# Patient Record
Sex: FEMALE | Race: WHITE | NOT HISPANIC OR LATINO | RURAL
[De-identification: names, ages, dates, MRNs, and addresses within clinical notes are randomized per-mention and may not be internally consistent; named-entity substitution may affect disease eponyms.]

---

## 2024-05-01 ENCOUNTER — OFFICE VISIT (OUTPATIENT)
Dept: FAMILY MEDICINE | Facility: CLINIC | Age: 54
End: 2024-05-01
Payer: COMMERCIAL

## 2024-05-01 VITALS
HEART RATE: 73 BPM | HEIGHT: 69 IN | TEMPERATURE: 98 F | OXYGEN SATURATION: 98 % | BODY MASS INDEX: 15.85 KG/M2 | SYSTOLIC BLOOD PRESSURE: 95 MMHG | WEIGHT: 107 LBS | DIASTOLIC BLOOD PRESSURE: 68 MMHG

## 2024-05-01 DIAGNOSIS — R05.9 COUGH, UNSPECIFIED TYPE: ICD-10-CM

## 2024-05-01 DIAGNOSIS — R52 GENERALIZED BODY ACHES: ICD-10-CM

## 2024-05-01 DIAGNOSIS — R19.7 DIARRHEA, UNSPECIFIED TYPE: ICD-10-CM

## 2024-05-01 DIAGNOSIS — J02.9 SORE THROAT: ICD-10-CM

## 2024-05-01 DIAGNOSIS — J32.9 SINUSITIS, UNSPECIFIED CHRONICITY, UNSPECIFIED LOCATION: Primary | ICD-10-CM

## 2024-05-01 LAB
CTP QC/QA: YES
MOLECULAR STREP A: NEGATIVE
POC MOLECULAR INFLUENZA A AGN: NEGATIVE
POC MOLECULAR INFLUENZA B AGN: NEGATIVE
SARS-COV-2 RDRP RESP QL NAA+PROBE: NEGATIVE

## 2024-05-01 PROCEDURE — 87635 SARS-COV-2 COVID-19 AMP PRB: CPT | Mod: QW,,, | Performed by: NURSE PRACTITIONER

## 2024-05-01 PROCEDURE — 87502 INFLUENZA DNA AMP PROBE: CPT | Mod: QW,,, | Performed by: NURSE PRACTITIONER

## 2024-05-01 PROCEDURE — 3078F DIAST BP <80 MM HG: CPT | Mod: ,,, | Performed by: NURSE PRACTITIONER

## 2024-05-01 PROCEDURE — 1160F RVW MEDS BY RX/DR IN RCRD: CPT | Mod: ,,, | Performed by: NURSE PRACTITIONER

## 2024-05-01 PROCEDURE — 3074F SYST BP LT 130 MM HG: CPT | Mod: ,,, | Performed by: NURSE PRACTITIONER

## 2024-05-01 PROCEDURE — 87651 STREP A DNA AMP PROBE: CPT | Mod: QW,,, | Performed by: NURSE PRACTITIONER

## 2024-05-01 PROCEDURE — 99213 OFFICE O/P EST LOW 20 MIN: CPT | Mod: ,,, | Performed by: NURSE PRACTITIONER

## 2024-05-01 PROCEDURE — 1159F MED LIST DOCD IN RCRD: CPT | Mod: ,,, | Performed by: NURSE PRACTITIONER

## 2024-05-01 PROCEDURE — 3008F BODY MASS INDEX DOCD: CPT | Mod: ,,, | Performed by: NURSE PRACTITIONER

## 2024-05-01 RX ORDER — AMOXICILLIN 875 MG/1
875 TABLET, FILM COATED ORAL EVERY 12 HOURS
Qty: 20 TABLET | Refills: 0 | Status: SHIPPED | OUTPATIENT
Start: 2024-05-01

## 2024-05-01 NOTE — PROGRESS NOTES
Clinic Note    Maribell Mariscal is a 53 y.o. female     Chief Complaint:   Chief Complaint   Patient presents with    Sore Throat    Sinus Problem     Patient states her grandchild had a cold this weekend. She states she assumed she picked up the cold from her grandchild. She states she has cough, diarrhea, sore throat, and hoarse.     Hoarse    Diarrhea     She noticed her stools begin to get loose 2 days ago.     Generalized Body Aches    Cough        Subjective:    Patient complains of sore throat, cough, and congestion. Admits to loose bm past 2 days. Denies fever. Patient states symptoms X 3 days.     Sore Throat   Associated symptoms include congestion, coughing and diarrhea. Pertinent negatives include no abdominal pain, headaches, shortness of breath or vomiting.   Sinus Problem  Associated symptoms include congestion, coughing, sinus pressure and a sore throat. Pertinent negatives include no headaches or shortness of breath.   Diarrhea   Associated symptoms include coughing. Pertinent negatives include no abdominal pain, fever, headaches or vomiting.   Cough  Associated symptoms include postnasal drip and a sore throat. Pertinent negatives include no fever, headaches or shortness of breath.        Allergies:   Review of patient's allergies indicates:  No Known Allergies     Past Medical History:  No past medical history on file.     Current Medications:    Current Outpatient Medications:     amoxicillin (AMOXIL) 875 MG tablet, Take 1 tablet (875 mg total) by mouth every 12 (twelve) hours., Disp: 20 tablet, Rfl: 0       Review of Systems   Constitutional:  Negative for fever.   HENT:  Positive for nasal congestion, postnasal drip, sinus pressure/congestion and sore throat.    Respiratory:  Positive for cough. Negative for shortness of breath.    Gastrointestinal:  Positive for diarrhea and nausea. Negative for abdominal pain and vomiting.   Neurological:  Negative for headaches.          Objective:    BP 95/68  "(BP Location: Left arm, Patient Position: Sitting, BP Method: Medium (Automatic))   Pulse 73   Temp 98.3 °F (36.8 °C) (Oral)   Ht 5' 9" (1.753 m)   Wt 48.5 kg (107 lb)   SpO2 98%   BMI 15.80 kg/m²      Physical Exam  Constitutional:       Appearance: Normal appearance.   HENT:      Nose: Congestion present. No rhinorrhea.      Right Sinus: Maxillary sinus tenderness present. No frontal sinus tenderness.      Left Sinus: Maxillary sinus tenderness present. No frontal sinus tenderness.      Comments: Tenderness right maxillary more so     Mouth/Throat:      Pharynx: Posterior oropharyngeal erythema present. No oropharyngeal exudate.   Eyes:      Extraocular Movements: Extraocular movements intact.   Cardiovascular:      Rate and Rhythm: Normal rate and regular rhythm.      Pulses: Normal pulses.      Heart sounds: Normal heart sounds.   Pulmonary:      Effort: Pulmonary effort is normal.      Breath sounds: Normal breath sounds.   Abdominal:      Palpations: Abdomen is soft.      Tenderness: There is no abdominal tenderness. There is no guarding or rebound.   Musculoskeletal:      Cervical back: Neck supple.   Lymphadenopathy:      Cervical: No cervical adenopathy.   Neurological:      Mental Status: She is alert and oriented to person, place, and time.          Assessment and Plan:    1. Sinusitis, unspecified chronicity, unspecified location    2. Generalized body aches    3. Sore throat    4. Diarrhea, unspecified type    5. Cough, unspecified type         Sinusitis, unspecified chronicity, unspecified location  -     amoxicillin (AMOXIL) 875 MG tablet; Take 1 tablet (875 mg total) by mouth every 12 (twelve) hours.  Dispense: 20 tablet; Refill: 0  -continue dayquil/nightquil prn    Generalized body aches  -     POCT Influenza A/B Molecular  -     POCT COVID-19 Rapid Screening  -alternate tylenol and ibuprofen prn fever/aches    Sore throat  -     POCT Strep A, Molecular    Diarrhea, unspecified type  -     " POCT Strep A, Molecular  -bland diet and advance as tolerated    Cough, unspecified type  -     POCT Influenza A/B Molecular  -     POCT COVID-19 Rapid Screening      Results for orders placed or performed in visit on 05/01/24   POCT Influenza A/B Molecular   Result Value Ref Range    POC Molecular Influenza A Ag Negative Negative    POC Molecular Influenza B Ag Negative Negative     Acceptable Yes      Results for orders placed or performed in visit on 05/01/24   POCT COVID-19 Rapid Screening   Result Value Ref Range    POC Rapid COVID Negative Negative     Acceptable Yes      Results for orders placed or performed in visit on 05/01/24   POCT Strep A, Molecular   Result Value Ref Range    Molecular Strep A, POC Negative Negative     Acceptable Yes          There are no Patient Instructions on file for this visit.   Follow up if symptoms worsen or fail to improve.

## 2025-05-14 ENCOUNTER — OFFICE VISIT (OUTPATIENT)
Dept: FAMILY MEDICINE | Facility: CLINIC | Age: 55
End: 2025-05-14
Payer: COMMERCIAL

## 2025-05-14 VITALS
SYSTOLIC BLOOD PRESSURE: 106 MMHG | BODY MASS INDEX: 17.51 KG/M2 | HEART RATE: 70 BPM | HEIGHT: 69 IN | RESPIRATION RATE: 20 BRPM | WEIGHT: 118.19 LBS | TEMPERATURE: 98 F | DIASTOLIC BLOOD PRESSURE: 70 MMHG | OXYGEN SATURATION: 97 %

## 2025-05-14 DIAGNOSIS — R21 RASH: ICD-10-CM
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
## 2025-05-14 DIAGNOSIS — S10.96XA TICK BITE OF NECK, INITIAL ENCOUNTER: Primary | ICD-10-CM

## 2025-05-14 PROCEDURE — 3008F BODY MASS INDEX DOCD: CPT | Mod: ,,, | Performed by: NURSE PRACTITIONER

## 2025-05-14 PROCEDURE — 1160F RVW MEDS BY RX/DR IN RCRD: CPT | Mod: ,,, | Performed by: NURSE PRACTITIONER

## 2025-05-14 PROCEDURE — 3078F DIAST BP <80 MM HG: CPT | Mod: ,,, | Performed by: NURSE PRACTITIONER

## 2025-05-14 PROCEDURE — 3074F SYST BP LT 130 MM HG: CPT | Mod: ,,, | Performed by: NURSE PRACTITIONER

## 2025-05-14 PROCEDURE — 1159F MED LIST DOCD IN RCRD: CPT | Mod: ,,, | Performed by: NURSE PRACTITIONER

## 2025-05-14 PROCEDURE — 99213 OFFICE O/P EST LOW 20 MIN: CPT | Mod: ,,, | Performed by: NURSE PRACTITIONER

## 2025-05-14 RX ORDER — DOXYCYCLINE 100 MG/1
100 CAPSULE ORAL 2 TIMES DAILY
Qty: 14 CAPSULE | Refills: 0 | Status: SHIPPED | OUTPATIENT
Start: 2025-05-14

## 2025-05-14 RX ORDER — METHYLPREDNISOLONE 4 MG/1
TABLET ORAL
Qty: 21 EACH | Refills: 0 | Status: SHIPPED | OUTPATIENT
Start: 2025-05-14 | End: 2025-06-04

## 2025-05-14 NOTE — PROGRESS NOTES
"Clinic Note    Maribell Mariscal is a 54 y.o. female     Chief Complaint:   Chief Complaint   Patient presents with    Rash        Subjective:    Patient complains of rash to neck and face. Patient reports itching. Has used hydrocortisone cream and coconut oil. Rash seems to be spreading. Denies drainage. Denies fever.  Patient states she was hiking about 3 days ago. States found a tick crawling on face. States when she looked back at photos tick was also on neck where rash started. Patient states it was a deer tic. Would like tick labs.     Rash  Pertinent negatives include no cough, diarrhea, fever, shortness of breath or vomiting.        Allergies:   Review of patient's allergies indicates:  No Known Allergies     Past Medical History:  History reviewed. No pertinent past medical history.     Current Medications:  Current Medications[1]       Review of Systems   Constitutional:  Negative for fever.   Respiratory:  Negative for cough and shortness of breath.    Cardiovascular:  Negative for chest pain, palpitations and leg swelling.   Gastrointestinal:  Negative for abdominal pain, constipation, diarrhea, nausea and vomiting.   Genitourinary:  Negative for dysuria.   Integumentary:  Positive for rash.   Neurological:  Negative for headaches.          Objective:    /70 (BP Location: Left arm, Patient Position: Sitting)   Pulse 70   Temp 98.2 °F (36.8 °C) (Oral)   Resp 20   Ht 5' 9" (1.753 m)   Wt 53.6 kg (118 lb 3.2 oz)   SpO2 97%   BMI 17.46 kg/m²      Physical Exam  Constitutional:       Appearance: Normal appearance.   Eyes:      Extraocular Movements: Extraocular movements intact.   Cardiovascular:      Rate and Rhythm: Normal rate and regular rhythm.      Pulses: Normal pulses.      Heart sounds: Normal heart sounds.   Pulmonary:      Effort: Pulmonary effort is normal.      Breath sounds: Normal breath sounds.   Skin:     Findings: Erythema and rash present. Rash is macular and papular. Rash is not " pustular.      Comments: Erythematous rash to face and neck. No bullseye rash. No pustules or blister   Neurological:      Mental Status: She is alert and oriented to person, place, and time.          Assessment and Plan:    1. Tick bite of neck, initial encounter    2. Rash         Tick bite of neck, initial encounter  -     Ehrlichia Antibody Panel; Future; Expected date: 05/14/2025  -     Spotted Fever Group Antibodies; Future; Expected date: 05/14/2025  -     Lyme Antibody w/ Immunoblot Reflex; Future; Expected date: 05/14/2025  -     doxycycline (MONODOX) 100 MG capsule; Take 1 capsule (100 mg total) by mouth 2 (two) times daily.  Dispense: 14 capsule; Refill: 0    Rash  -     methylPREDNISolone (MEDROL DOSEPACK) 4 mg tablet; use as directed  Dispense: 21 each; Refill: 0    -discussed avoiding fragrance products      There are no Patient Instructions on file for this visit.   Follow up if symptoms worsen or fail to improve.          [1]   Current Outpatient Medications:     doxycycline (MONODOX) 100 MG capsule, Take 1 capsule (100 mg total) by mouth 2 (two) times daily., Disp: 14 capsule, Rfl: 0    methylPREDNISolone (MEDROL DOSEPACK) 4 mg tablet, use as directed, Disp: 21 each, Rfl: 0

## 2025-05-16 LAB
A PHAGOCYTOPH IGG TITR SER IF: NORMAL TITER
E CHAFFEENSIS IGG TITR SER IF: NORMAL TITER

## 2025-05-17 LAB
RICK SF IGG TITR SER IF: NORMAL {TITER}
RICK SF IGM TITR SER IF: NORMAL {TITER}

## 2025-05-21 ENCOUNTER — RESULTS FOLLOW-UP (OUTPATIENT)
Dept: FAMILY MEDICINE | Facility: CLINIC | Age: 55
End: 2025-05-21

## 2025-05-23 ENCOUNTER — OFFICE VISIT (OUTPATIENT)
Dept: FAMILY MEDICINE | Facility: CLINIC | Age: 55
End: 2025-05-23
Payer: COMMERCIAL

## 2025-05-23 VITALS
SYSTOLIC BLOOD PRESSURE: 120 MMHG | OXYGEN SATURATION: 98 % | TEMPERATURE: 99 F | BODY MASS INDEX: 17.53 KG/M2 | RESPIRATION RATE: 18 BRPM | DIASTOLIC BLOOD PRESSURE: 72 MMHG | HEART RATE: 85 BPM | HEIGHT: 69 IN | WEIGHT: 118.38 LBS
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
## 2025-05-23 DIAGNOSIS — R09.81 NASAL CONGESTION: ICD-10-CM

## 2025-05-23 DIAGNOSIS — M25.50 ARTHRALGIA, UNSPECIFIED JOINT: ICD-10-CM

## 2025-05-23 DIAGNOSIS — R21 RASH: ICD-10-CM

## 2025-05-23 DIAGNOSIS — S80.861A TICK BITE OF RIGHT LOWER LEG, INITIAL ENCOUNTER: ICD-10-CM

## 2025-05-23 DIAGNOSIS — U07.1 COVID: Primary | ICD-10-CM

## 2025-05-23 LAB
ALBUMIN SERPL BCP-MCNC: 4 G/DL (ref 3.5–5)
ALBUMIN/GLOB SERPL: 1.2 {RATIO}
ALP SERPL-CCNC: 60 U/L (ref 40–150)
ALT SERPL W P-5'-P-CCNC: 14 U/L
ANION GAP SERPL CALCULATED.3IONS-SCNC: 14 MMOL/L (ref 7–16)
AST SERPL W P-5'-P-CCNC: 26 U/L (ref 11–45)
BASOPHILS # BLD AUTO: 0.03 K/UL (ref 0–0.2)
BASOPHILS NFR BLD AUTO: 0.5 % (ref 0–1)
BILIRUB SERPL-MCNC: 0.3 MG/DL
BUN SERPL-MCNC: 15 MG/DL (ref 10–20)
BUN/CREAT SERPL: 18 (ref 6–20)
CALCIUM SERPL-MCNC: 8.6 MG/DL (ref 8.4–10.2)
CHLORIDE SERPL-SCNC: 104 MMOL/L (ref 98–107)
CO2 SERPL-SCNC: 26 MMOL/L (ref 22–29)
CREAT SERPL-MCNC: 0.82 MG/DL (ref 0.55–1.02)
CRP SERPL HS-MCNC: 4.79 MG/L
CTP QC/QA: YES
CTP QC/QA: YES
DIFFERENTIAL METHOD BLD: ABNORMAL
EGFR (NO RACE VARIABLE) (RUSH/TITUS): 86 ML/MIN/1.73M2
EOSINOPHIL # BLD AUTO: 0.24 K/UL (ref 0–0.5)
EOSINOPHIL NFR BLD AUTO: 3.8 % (ref 1–4)
ERYTHROCYTE [DISTWIDTH] IN BLOOD BY AUTOMATED COUNT: 13.1 % (ref 11.5–14.5)
ERYTHROCYTE [SEDIMENTATION RATE] IN BLOOD BY WESTERGREN METHOD: 2 MM/HR (ref 0–30)
GLOBULIN SER-MCNC: 3.4 G/DL (ref 2–4)
GLUCOSE SERPL-MCNC: 97 MG/DL (ref 74–100)
HCT VFR BLD AUTO: 42.9 % (ref 38–47)
HGB BLD-MCNC: 13.6 G/DL (ref 12–16)
IMM GRANULOCYTES # BLD AUTO: 0.01 K/UL (ref 0–0.04)
IMM GRANULOCYTES NFR BLD: 0.2 % (ref 0–0.4)
LYMPHOCYTES # BLD AUTO: 1.95 K/UL (ref 1–4.8)
LYMPHOCYTES NFR BLD AUTO: 30.7 % (ref 27–41)
MCH RBC QN AUTO: 31.2 PG (ref 27–31)
MCHC RBC AUTO-ENTMCNC: 31.7 G/DL (ref 32–36)
MCV RBC AUTO: 98.4 FL (ref 80–96)
MONOCYTES # BLD AUTO: 0.92 K/UL (ref 0–0.8)
MONOCYTES NFR BLD AUTO: 14.5 % (ref 2–6)
MPC BLD CALC-MCNC: 11 FL (ref 9.4–12.4)
NEUTROPHILS # BLD AUTO: 3.21 K/UL (ref 1.8–7.7)
NEUTROPHILS NFR BLD AUTO: 50.3 % (ref 53–65)
NRBC # BLD AUTO: 0 X10E3/UL
NRBC, AUTO (.00): 0 %
PLATELET # BLD AUTO: 324 K/UL (ref 150–400)
POC MOLECULAR INFLUENZA A AGN: NEGATIVE
POC MOLECULAR INFLUENZA B AGN: NEGATIVE
POTASSIUM SERPL-SCNC: 3.9 MMOL/L (ref 3.5–5.1)
PROT SERPL-MCNC: 7.4 G/DL (ref 6.4–8.3)
RBC # BLD AUTO: 4.36 M/UL (ref 4.2–5.4)
RHEUMATOID FACT SER NEPH-ACNC: NEGATIVE [IU]/ML
SARS-COV-2 RDRP RESP QL NAA+PROBE: POSITIVE
SODIUM SERPL-SCNC: 140 MMOL/L (ref 136–145)
WBC # BLD AUTO: 6.36 K/UL (ref 4.5–11)

## 2025-05-23 PROCEDURE — 80053 COMPREHEN METABOLIC PANEL: CPT | Mod: ,,, | Performed by: CLINICAL MEDICAL LABORATORY

## 2025-05-23 PROCEDURE — 85651 RBC SED RATE NONAUTOMATED: CPT | Mod: ,,, | Performed by: CLINICAL MEDICAL LABORATORY

## 2025-05-23 PROCEDURE — 86430 RHEUMATOID FACTOR TEST QUAL: CPT | Mod: ,,, | Performed by: CLINICAL MEDICAL LABORATORY

## 2025-05-23 PROCEDURE — 85025 COMPLETE CBC W/AUTO DIFF WBC: CPT | Mod: ,,, | Performed by: CLINICAL MEDICAL LABORATORY

## 2025-05-23 PROCEDURE — 86141 C-REACTIVE PROTEIN HS: CPT | Mod: ,,, | Performed by: CLINICAL MEDICAL LABORATORY

## 2025-05-23 RX ORDER — DOXYCYCLINE 100 MG/1
100 CAPSULE ORAL 2 TIMES DAILY
Qty: 14 CAPSULE | Refills: 0 | Status: SHIPPED | OUTPATIENT
Start: 2025-05-23 | End: 2025-05-30

## 2025-05-23 RX ORDER — METHYLPREDNISOLONE 4 MG/1
TABLET ORAL
Qty: 21 EACH | Refills: 0 | Status: SHIPPED | OUTPATIENT
Start: 2025-05-23 | End: 2025-06-13

## 2025-05-23 RX ORDER — DOXYCYCLINE 100 MG/1
100 CAPSULE ORAL 2 TIMES DAILY
Qty: 14 CAPSULE | Refills: 0 | Status: SHIPPED | OUTPATIENT
Start: 2025-05-23 | End: 2025-05-23 | Stop reason: SDUPTHER

## 2025-05-23 RX ORDER — METHYLPREDNISOLONE 4 MG/1
TABLET ORAL
Qty: 21 EACH | Refills: 0 | Status: SHIPPED | OUTPATIENT
Start: 2025-05-23 | End: 2025-05-23 | Stop reason: SDUPTHER

## 2025-05-23 NOTE — PROGRESS NOTES
Progress Note     JOAN OWEN MD   [address]  [place] [address]     PATIENT NAME: Maribell Mariscal  : 1970  DATE: 25  MRN: [id number]      Billing Provider: JOAN OWEN MD  Level of Service: IA OFFICE/OUTPT VISIT, EST, LEVL IV, 30-39 MIN  Patient PCP Information       Provider PCP Type    Primary Doctor No General                Rash (Found a tick on her R leg Tuesday./Now has another reddened rash to her neck around both eyes./States the rashed areas do itch./Seen [facility] [address] for a tick bite as well, had tick panels drawn./States her symptoms right now are similar to her previous symptoms at that visit.), Nasal Congestion (Started Tuesday.), Cough (Productive cough, clear and yellow/green phlegm./Symptoms started Tuesday.), Ear Fullness (R ear fullness w/ popping noises at times./States it does seem like its improving slightly.), and Edema (Edema/Redness noted around both eyes.)      SUBJECTIVE:     Maribell Mariscal is a 55 y.o.female who presents to clinic for Rash (Found a tick on her R leg Tuesday./Now has another reddened rash to her neck around both eyes./States the rashed areas do itch./Seen [facility] [address] for a tick bite as well, had tick panels drawn./States her symptoms right now are similar to her previous symptoms at that visit.), Nasal Congestion (Started Tuesday.), Cough (Productive cough, clear and yellow/green phlegm./Symptoms started Tuesday.), Ear Fullness (R ear fullness w/ popping noises at times./States it does seem like its improving slightly.), and Edema (Edema/Redness noted around both eyes.)    Patient presents to clinic today with a rash and URI symptoms.    Patient notes onset of URI symptoms Tuesday.  Patient reports nasal congestion, cough, ear fullness.  Patient believes she was febrile as well earlier in the week.  Patient works at a school.  Patient believes that her symptoms are improving.   "However, she woke this morning with neck and patient will rash as well as rash surrounding recent tick bite.  Patient was treated for a tick bite with similar rash on 05/14 with doxycycline and Medrol Dosepak with resolution of symptoms.  She notes that she found a tick on her leg on Tuesday.    Of note, patient does have a history of diffuse joint pain and arthritis.  She notes significant morning stiffness that improves throughout the day.  Her mom has Sjogren's and rheumatoid arthritis.  She has never personally been diagnosed with an autoimmune disease.    All other pertinent review of systems negative. Please see HPI for details.     Past Medical History:  has no past medical history on file.   Past Surgical History:  has no past surgical history on file.  Family History: family history is not on file.  Social History:  reports that she has quit smoking. Her smoking use included cigarettes and vaping with nicotine. She has never been exposed to tobacco smoke. She has never used smokeless tobacco. She reports that she does not drink alcohol and does not use drugs.  Allergies: Review of patient's allergies indicates:  No Known Allergies    Current Medications[1]   OBJECTIVE:     Vital Signs   /72   Pulse 85   Temp 98.8 °F (37.1 °C)   Resp 18   Ht 5' 9" (1.753 m)   Wt 53.7 kg (118 lb 6.4 oz)   LMP  (LMP Unknown)   SpO2 98%   BMI 17.48 kg/m²     Physical Exam  Constitutional:       General: She is not in acute distress.     Appearance: Normal appearance. She is not ill-appearing, toxic-appearing or diaphoretic.   HENT:      Head: Normocephalic and atraumatic.      Right Ear: Tympanic membrane normal.      Left Ear: Tympanic membrane normal.      Nose: Congestion present. No rhinorrhea.      Mouth/Throat:      Mouth: Mucous membranes are moist.      Pharynx: No oropharyngeal exudate or posterior oropharyngeal erythema.   Eyes:      Extraocular Movements: Extraocular movements intact.      Pupils: " Pupils are equal, round, and reactive to light.   Cardiovascular:      Rate and Rhythm: Normal rate and regular rhythm.      Pulses: Normal pulses.      Heart sounds: Normal heart sounds. No murmur heard.     No friction rub. No gallop.   Pulmonary:      Effort: Pulmonary effort is normal. No respiratory distress.      Breath sounds: No wheezing, rhonchi or rales.   Abdominal:      General: Abdomen is flat.      Palpations: Abdomen is soft.      Tenderness: There is no abdominal tenderness. There is no guarding or rebound.   Musculoskeletal:         General: Normal range of motion.      Cervical back: Normal range of motion.   Skin:     General: Skin is warm and dry.      Capillary Refill: Capillary refill takes less than 2 seconds.      Comments: Malar rash with associated periorbital component.  No tenderness to palpation.  Erythematous rash long neck.  Right leg with what appears to be tick bite with surrounding erythema.   Neurological:      General: No focal deficit present.      Mental Status: She is alert.   Psychiatric:         Mood and Affect: Mood normal.         Behavior: Behavior normal.         ASSESSMENT/PLAN:     1. COVID    2. Nasal congestion  -     POCT COVID-19 Rapid Screening  -     POCT Influenza A/B Molecular  Patient URI symptoms likely unrelated to rash in recent tick bite.  As a result flu and COVID testing were obtained and patient was found to be positive for COVID-19.  Counseled on supportive care with Zyrtec and Flonase nasal spray.  Discussed isolation precautions.  Work excuse provided.  Discussed emergency precautions.  Patient verbalized understanding.    3. Tick bite of right lower leg, initial encounter  -     Discontinue: doxycycline (VIBRAMYCIN) 100 MG Cap; Take 1 capsule (100 mg total) by mouth 2 (two) times daily. for 7 days  Dispense: 14 capsule; Refill: 0  -     doxycycline (VIBRAMYCIN) 100 MG Cap; Take 1 capsule (100 mg total) by mouth 2 (two) times daily. for 7 days   Dispense: 14 capsule; Refill: 0  Patient with recent tick bite on right lower extremity.  Patient with similar reaction with previous tick bite earlier in the month.  We will treat empirically with doxycycline.     4. Rash  -     Rheumatoid Factor Screen; Future; Expected date: 05/23/2025  -     NIKO EIA w/ Reflex to dsDNA/KIMBERLEY; Future; Expected date: 05/23/2025  -     Sedimentation Rate; Future; Expected date: 05/23/2025  -     CRP, High Sensitivity; Future; Expected date: 05/23/2025  -     CBC Auto Differential; Future; Expected date: 05/23/2025  -     Comprehensive Metabolic Panel; Future; Expected date: 05/23/2025  -     Discontinue: methylPREDNISolone (MEDROL DOSEPACK) 4 mg tablet; use as directed  Dispense: 21 each; Refill: 0  -     methylPREDNISolone (MEDROL DOSEPACK) 4 mg tablet; use as directed  Dispense: 21 each; Refill: 0    5. Arthralgia, unspecified joint  -     Rheumatoid Factor Screen; Future; Expected date: 05/23/2025  -     NIKO EIA w/ Reflex to dsDNA/KIMBERLEY; Future; Expected date: 05/23/2025  -     Sedimentation Rate; Future; Expected date: 05/23/2025  -     CRP, High Sensitivity; Future; Expected date: 05/23/2025  -     CBC Auto Differential; Future; Expected date: 05/23/2025  -     Comprehensive Metabolic Panel; Future; Expected date: 05/23/2025  -     Discontinue: methylPREDNISolone (MEDROL DOSEPACK) 4 mg tablet; use as directed  Dispense: 21 each; Refill: 0  -     methylPREDNISolone (MEDROL DOSEPACK) 4 mg tablet; use as directed  Dispense: 21 each; Refill: 0   Patient patient will rash is concerning for autoimmune disorder particularly given family history, persistent joint pain/stiffness, and prior improvement with steroid pack.  It is possible that reaction is secondary to recent tick bite given patient had similar symptoms earlier in the month after tick bite.  Again patient has been treated empirically with doxycycline.  Providing steroid pack and performing evaluation for autoimmune  arthritis.      Follow up in about 4 weeks (around 6/20/2025).      JOAN OWEN MD  05/23/2025    Due to voice recognition software, sound alike and misspelled words may be contained in the documentation.              [1]   Current Outpatient Medications:     doxycycline (VIBRAMYCIN) 100 MG Cap, Take 1 capsule (100 mg total) by mouth 2 (two) times daily. for 7 days, Disp: 14 capsule, Rfl: 0    methylPREDNISolone (MEDROL DOSEPACK) 4 mg tablet, use as directed, Disp: 21 each, Rfl: 0

## 2025-05-23 NOTE — LETTER
May 23, 2025      [facility] [address] [place] [address]  Phone: [phone]  Fax: [phone]       Patient: Maribell Mariscal   YOB: 1970  Date of Visit: 05/23/2025    To Whom It May Concern:    Jazmine Mariscal  was at [facility] 5/23/25 on  The patient may return to work/school on 5/23/25 with no restrictions. Please excuse the patient from 5/21/25 to 5/23/25.  If you have any questions or concerns, or if I can be of further assistance, please do not hesitate to contact me.    Sincerely,  MD Yelena Miguel LPN

## 2025-05-27 ENCOUNTER — RESULTS FOLLOW-UP (OUTPATIENT)
Dept: FAMILY MEDICINE | Facility: CLINIC | Age: 55
End: 2025-05-27

## 2025-05-27 NOTE — PROGRESS NOTES
Please let patient know that her inflammatory marker is elevated.  Her rheumatoid screen was negative.  Her lupus screen is still pending.  We will update her when that returns.  Her blood count is normal.  Her electrolytes, kidney function, and liver enzymes are also normal.

## 2025-05-27 NOTE — PROGRESS NOTES
Please let patient know that her NIKO screen is negative.  Given her diffuse joint pain and joint stiffness, I can refer her to Rheumatology if she is amenable.